# Patient Record
Sex: FEMALE | Race: WHITE
[De-identification: names, ages, dates, MRNs, and addresses within clinical notes are randomized per-mention and may not be internally consistent; named-entity substitution may affect disease eponyms.]

---

## 2019-11-19 ENCOUNTER — HOSPITAL ENCOUNTER (OUTPATIENT)
Dept: HOSPITAL 59 - SUR | Age: 74
LOS: 1 days | Discharge: HOME | End: 2019-11-20
Attending: ORTHOPAEDIC SURGERY
Payer: MEDICARE

## 2019-11-19 DIAGNOSIS — I10: ICD-10-CM

## 2019-11-19 DIAGNOSIS — M17.11: Primary | ICD-10-CM

## 2019-11-19 DIAGNOSIS — K21.9: ICD-10-CM

## 2019-11-19 LAB
ABO GROUP: (no result)
ANTIBODY SCREEN: NEGATIVE
RH TYPE: NEGATIVE

## 2019-11-19 PROCEDURE — 86901 BLOOD TYPING SEROLOGIC RH(D): CPT

## 2019-11-19 PROCEDURE — 85014 HEMATOCRIT: CPT

## 2019-11-19 PROCEDURE — 86850 RBC ANTIBODY SCREEN: CPT

## 2019-11-19 PROCEDURE — 80048 BASIC METABOLIC PNL TOTAL CA: CPT

## 2019-11-19 PROCEDURE — 86900 BLOOD TYPING SEROLOGIC ABO: CPT

## 2019-11-19 PROCEDURE — 85018 HEMOGLOBIN: CPT

## 2019-11-19 RX ADMIN — VANCOMYCIN SCH MLS/HR: 1 INJECTION, SOLUTION INTRAVENOUS at 22:56

## 2019-11-19 RX ADMIN — HYDROCODONE BITARTRATE AND ACETAMINOPHEN PRN EACH: 325; 10 TABLET ORAL at 20:15

## 2019-11-19 RX ADMIN — DOCUSATE SODIUM SCH MG: 100 TABLET, FILM COATED ORAL at 21:19

## 2019-11-19 RX ADMIN — HYDROCODONE BITARTRATE AND ACETAMINOPHEN PRN EACH: 325; 10 TABLET ORAL at 16:49

## 2019-11-19 RX ADMIN — DEXTROSE AND SODIUM CHLORIDE SCH MLS/HR: 5; .9 INJECTION, SOLUTION INTRAVENOUS at 15:56

## 2019-11-20 LAB
ANION GAP SERPL CALC-SCNC: 10 MMOL/L (ref 7–16)
BUN SERPL-MCNC: 16 MG/DL (ref 8–23)
CO2 SERPL-SCNC: 24 MMOL/L (ref 22–29)
CREAT SERPL-MCNC: 0.8 MG/DL (ref 0.5–0.9)
EST GLOMERULAR FILTRATION RATE: > 60 ML/MIN
GLUCOSE SERPL-MCNC: 105 MG/DL (ref 74–109)
HCT VFR BLD CALC: 35.5 % (ref 35–47)
HGB BLD-MCNC: 10.8 GM/DL (ref 11.6–16)

## 2019-11-20 RX ADMIN — DEXTROSE AND SODIUM CHLORIDE SCH: 5; .9 INJECTION, SOLUTION INTRAVENOUS at 03:58

## 2019-11-20 RX ADMIN — HYDROCODONE BITARTRATE AND ACETAMINOPHEN PRN EACH: 325; 10 TABLET ORAL at 10:14

## 2019-11-20 RX ADMIN — HYDROCODONE BITARTRATE AND ACETAMINOPHEN PRN EACH: 325; 10 TABLET ORAL at 14:01

## 2019-11-20 RX ADMIN — DOCUSATE SODIUM SCH MG: 100 TABLET, FILM COATED ORAL at 10:14

## 2019-11-20 RX ADMIN — VANCOMYCIN SCH: 1 INJECTION, SOLUTION INTRAVENOUS at 12:03

## 2019-11-20 RX ADMIN — DEXTROSE AND SODIUM CHLORIDE SCH: 5; .9 INJECTION, SOLUTION INTRAVENOUS at 12:03

## 2019-11-20 RX ADMIN — HYDROCODONE BITARTRATE AND ACETAMINOPHEN PRN EACH: 325; 10 TABLET ORAL at 03:58

## 2019-11-20 RX ADMIN — VANCOMYCIN SCH MLS/HR: 1 INJECTION, SOLUTION INTRAVENOUS at 10:15

## 2019-11-20 NOTE — OPERATIVE NOTE
DATE OF SURGERY: 11/19/2019 



PREOPERATIVE DIAGNOSIS: End-stage arthrosis of the right knee. 



POSTOPERATIVE DIAGNOSIS: End-stage arthrosis of the right knee. 



OPERATION: Cemented right total knee arthroplasty using [employer]hew 
components with a size 5 cobalt chrome Legion femoral component, a size 4 
stemmed tibia baseplate, an 11 mm lipped tibial insert, and a 35 mm all-plastic 
patella. 



STAFF SURGEON: Raza Padilla MD 



ANESTHESIA: Spinal. 



PREPARATION: Chloraprep. 



INDIVIDUAL CONSIDERATIONS: None. 



PROCEDURE: The patient was taken to the operating room, placed supine on the 
operating room table. She had a successful induction of a spinal anesthetic. The
right lower extremity was prepped and draped in the usual fashion. 



The limb was elevated and tourniquet was inflated to 250 mmHg. The patient had a
midline approach to the knee. Sharp dissection carried down through skin and 
subcutaneous tissue. Small veins were coagulated with a Bovie. A medial 
arthrotomy was performed. The patella was everted and the knee was flexed. The 
patient had bone loss and exposed bone in the medial compartment and obviously 
cartilage loss. There was also exposed bone in the notch. Fat pad was resected, 
ACL was sacrificed, and provisional anterior meniscectomies were performed. The 
capsule was released from the medial proximal tibia. The initial femoral  
hole was then made freehand. The intramedullary femoral cutting jig was placed. 
It was cut in 7.0 degrees of valgus and adjusted for rotation and secured with 
pins for a 10 mm resection. The initial transverse cut was then made. The skin 
guide was placed in the anterior and posterior  holes. It was found that a 
size 5 would be appropriate but I had to translate it anteriorly 2 mm. The 
anterior and posterior cuts followed by chamfer cuts were made. Osteophytes 
removed, and a size 5 trial was placed and found to fit well. 



The tibia was brought forward, and the remainder of the meniscal remnants 
removed with a Bovie. The extraarticular tibial cutting jig was placed. It was 
cut in neutral with a 3-degree AP slope. Care was taken to adjust for rotation 
using the extraarticular alignment guide and bony landmarks. It was set for a 9 
mm resection keyed off the high lateral side and secured with pins. When cutting
the tibia, care was taken to preserve the PCL insertion on the tibia. It was 
found that a size 4 fit appropriately and it was adjusted for rotation and 
secured with pins. With an 11 mm trial and femoral trial, there was excellent 
motion and stability. Ligamentous balance and rotation alignment were thought to
be normal. Femoral  holes were impacted and the tibial keel stamp was 
impacted, and these trial components were removed. 



The patient had a thick patella and roughly 9 mm of bone was removed freehand. I
was easily able to fit a 35 patella. The 3  holes were drilled. The 
tourniquet was let down briefly to get bleeders posteriorly and then placed back
up again. PCL was found to be completely competent. The knee was then thoroughly
irrigated out with pulsatile Betadine and saline to remove any visual or 
palpable debris. Bony surfaces were then dried. A size 4 stemmed tibia baseplate
was cemented into place followed by impaction of the 11 mm lipped tibial insert 
followed by cementing in the size 5 cobalt chrome Legion femur followed by 
cementing in the 35 mm patella. The implant surfaces were compressed, excess 
cement was removed. After the cement had set, there was excellent motion and 
stability. Ligamentous balance, rotation alignment, and patellofemoral tracking 
were normal. No lateral release was required. After irrigation, tourniquet was 
let down. Hemostasis was obtained with a Bovie. The capsule was then closed with
a running #2 quill. Prior to this, I infiltrate the skin, subcu, and periosteum 
with 30 mL of 0.5% Marcaine with epinephrine. After closing the capsule, the 
subcu was closed in layers with running 0 quill, skin was closed with staples. 
Then 1 g of tranexamic acid was mixed with 30 mL of saline and injected into the
knee through a sterile 18-gauge needle, and a sterile bulky compressive YAHIR-
type dressing was applied. The patient tolerated the procedure well. Needle and 
sponge counts were correct. Estimated blood loss was minimal, and she was taken 
back to recovery in good condition. There were no complications. 

TYRON

## 2019-11-20 NOTE — REHAB EVALUATION
Patient Information





- Patient Information


Diagnosis: R Knee DJD


Ordered Treatment: PT Evaluate and Treat


Status: Initial Evaluation


Surgery: Yes (R TKA)


Date of Surgery: 11/19/19


Past Medical/Surgical Hx: 


                          PAST MEDICAL/SURGICAL HISTORY





Past Surgical History            BACK INJS


                                 RIGHT KNEE SCOPE


                                 TONSILS


                                 BUNIONECTOMY


                                 BALAT CATARACTS


                                 C SCOPES





PMH - Respiratory





Hx Respiratory Disorders         Yes


Hx Bronchitis                    Yes: IN THE PAST





PMH - Cardiovascular





Hx Cardiovascular Disorders      Yes


Hx Hypertension                  Yes: CONTROLLED WITH MEDS


Exercise Tolerance               Fair





PMH - Neuro





Hx Neurological Disorders        No





PMH - GI





Hx Gastrointestinal Disorders    Yes


Hx Gastroesophageal Reflux       Yes: OCCASSIONALLY USES ZANTAC





PMH - 





Hx Genitourinary Disorders       Yes


Hx Age of Menopause              48


Hx Bladder Problem               Yes: LEAKY BLADDER





PMH - Endocrine





Hx Endocrine Disorders           No





PMH - Musculoskeletal





Hx Musculoskeletal Disorders     Yes


Hx Arthritis                     Yes: RIGHT KNEE, FEET AND HANDS


Comment:                         LEFT DROP FOOT





PMH - Psych





Hx Psychiatric Problems          No





PMH - Hematology/Oncology





Hx Hematology/Oncology           Yes


Disorders                        


Hx Bruising                      Yes: BRUISES EASILY








Premorbid Status: Detail (Patient reported that she used her staight cane at 

times before surgery when ambulating long distances.)


Social History: Detail (Patient lives in a 1-story home with her spouse. There 

are 3 steps to enter with a railing on the R when entering. In the bathroom 

there is a tub/shower combination with a hand held shower head and a shower 

bench but no grab bars. There is an elevated toilet seat and she stated that 

there is a sink to hang onto to get up. She has a front-wheeled walker and a 

straight cane available to use.)


Precautions: Universal, Fall, Other (WBAT on R LE)





- Time With Patient


Total Time Spent With Patient (Min): 30


Treatment Procedures: Detail (Initial evaluation; low complexity The patient was

left in bed with her call light and bedside table within reach. The nursing 

staff was notified of her position.)





Subjective Information





- Subjective Information


Per Patient (Patient reported that she was feeling well. She said that she was 

having 5-6/10 pain in her R knee.)





Objective Data





- Pain


Pain Present: Yes (R knee)


Pain Scale Used: Numeric (1 - 10) (5-6/10)





- Mental Status


Patient Orientation: Oriented x3





- Visual Perception


Appears within normal limits for therapeutic activities





- ROM


Not within normal limits (R knee ROM is limited as expected s/p R TKA procedure.

R ankle and hip, and L LE AROM is within normal limits for functional 

activities.)





- Strength/Tone


Not within normal limits (R knee strength is limited as expected s/p R TKA 

procedure. R ankle and hip, and L LE strenght is within normal limits for 

functional activities.)





- Coordination


Appears within normal limits for therapeutic activities





- Bed Mobility


Independent (Patient was independent in bed mobility tasks.)





- Transfers


Independent (Patient was independent in sit to stand, stand to sit, supine to 

sit, and sit to supine.)





- Balance


Balance Sitting: Good


Balance Standing: Good





- Gait


Detail (Patient ambulated 125 feet using the front-wheeled walker independently 

using WBAT on the R LE. She completed stair training over 3 stairs with 

supervision using the correct technique and the walker as an extra hand 

railing.)





Therapy Assessment





- Therapy Assessment


Detail (Patient presents with decreased R knee ROM, decreased R knee strength, 

and gait abnormalities. She has met all of her inpatient goals at this time.)





Patient Education





- Patient Education


Teaching Topic: Exercise/Activity (Patient's knee HEP was reviewed with her. The

patient demonstrated good understanding and technique of all exercises.)





Problem List





- Problem List


Physical Therapy Problem List: Detail (1. R knee pain 2. Decreased R knee ROM 3.

Gait abnormalities 4. Decreased R knee strength)





Goals





- Goals


Physical Therapy Goals: All inpatient therapy goals have been met at this time.





Prognosis





- Prognosis


Good





Plan





- Plan


Physical Therapy Plan: Patient has met all of her inpatient therapy goals. She 

is schedule to begin home therapy following discharge.

## 2019-11-20 NOTE — REHAB EVALUATION
Patient Information





- Patient Information


Diagnosis: R Knee DJD


Ordered Treatment: OT Evaluate and Treat


Status: Initial Evaluation


Surgery: Yes (R TKA)


Date of Surgery: 11/19/19


Past Medical/Surgical Hx: 


                          PAST MEDICAL/SURGICAL HISTORY





Past Surgical History            BACK INJS


                                 RIGHT KNEE SCOPE


                                 TONSILS


                                 BUNIONECTOMY


                                 BALAT CATARACTS


                                 C SCOPES





PMH - Respiratory





Hx Respiratory Disorders         Yes


Hx Bronchitis                    Yes: IN THE PAST





PMH - Cardiovascular





Hx Cardiovascular Disorders      Yes


Hx Hypertension                  Yes: CONTROLLED WITH MEDS


Exercise Tolerance               Fair





PMH - Neuro





Hx Neurological Disorders        No





PMH - GI





Hx Gastrointestinal Disorders    Yes


Hx Gastroesophageal Reflux       Yes: OCCASSIONALLY USES ZANTAC





PMH - 





Hx Genitourinary Disorders       Yes


Hx Age of Menopause              48


Hx Bladder Problem               Yes: LEAKY BLADDER





PMH - Endocrine





Hx Endocrine Disorders           No





PMH - Musculoskeletal





Hx Musculoskeletal Disorders     Yes


Hx Arthritis                     Yes: RIGHT KNEE, FEET AND HANDS


Comment:                         LEFT DROP FOOT





PMH - Psych





Hx Psychiatric Problems          No





PMH - Hematology/Oncology





Hx Hematology/Oncology           Yes


Disorders                        


Hx Bruising                      Yes: BRUISES EASILY








Premorbid Status: Detail (Patient reported that she used her staight cane at 

times before surgery when ambulating long distances, independent with all ADLs.)


Social History: Detail (Patient lives in a multi-level home with her spouse. 

There are 3 steps to enter with a railing on the R when entering. There are 

bilateral handrails on the steps to the basement laundry, however Pt reports her

daughters can help if needed with laundry at OR. In the bathroom there is a 

tub/shower combination with a hand held shower head and a shower chair but no 

grab bars. She has an elevated toilet seat and she stated that there is a sink 

to hang onto to get up. She has a front-wheeled walker and a straight cane 

available to use.)


Precautions: Universal, Fall, Other (WBAT on R LE)





- Time With Patient


Total Time Spent With Patient (Min): 40 (1 eval low, 1 ADL)


Treatment Procedures: Detail (OT eval: low complexity)





Subjective Information





- Subjective Information


Per Patient (Pt supine in bed upon arrival, wanting to leave at 1pm.  Session 

ended w/ Pt up in chair with ice on R knee and call light in reach.)





Objective Data





- Pain


Pain Present: Yes (5/10 R knee, recently had pain meds)





- Mental Status


Patient Orientation: Oriented x3





- Visual Perception


Appears within normal limits for therapeutic activities





- ROM


Within normal limits (B UEs)





- Strength/Tone


Within normal limits (B UEs)





- Coordination


Appears within normal limits for therapeutic activities





- Bed Mobility


Independent





- Transfers


Independent (supine to EOB)





- Balance


Balance Sitting: Good, Fair


Balance Standing: Fair (w/ walker)





- Sensation


Intact





- Gait


Detail (Fxl mobility within bedroom with FWW and supervision - verbal cueing for

safe walker use, Pt demos increasing follow thru throughout eval.)





- ADL's/IADL's


Detail (OT educates/demos adaptive technique for LB dressing and Pt demos follow

thru to don/doff underwear, pants, socks, and slippers with increased effort.  

Pt acknowledges tedhose wearing schedule, therapist educates on adaptive techs 

for increased success. OT educates Pt on importance of using shower chair 

initially upon DC, Pt verbalizes understanding.)





- Special Tests


No





Therapy Assessment





- Therapy Assessment


Detail (Pt demos safety and independence with all ADLs and fxl mobility with 

walker. Pt will have spouse available to assist upon DC.  No further IP OT needs

identified.)





Patient Education





- Patient Education


Teaching Topic: Other (adaptive techniques)


Response: Return Demonstration, Verbalize Understanding


Teaching Method: Discussion, Demonstration


Teaching Recipient: Patient


Barriers To Learning: None





Problem List





- Problem List


Physical Therapy Problem List: Detail (1. R knee pain 2. Decreased R knee ROM 3.

Gait abnormalities 4. Decreased R knee strength)


Occupational Therapy Problem List: Detail (No further IP OT needs identified.)





Goals





- Goals


Physical Therapy Goals: All inpatient therapy goals have been met at this time.


Occupational Therapy Goals: No further IP OT needs/goals identified.





Prognosis





- Prognosis


Good





Plan





- Plan


Physical Therapy Plan: Patient has met all of her inpatient therapy goals. She 

is schedule to begin home therapy following discharge.


Occupational Therapy Plan: No further skilled IP OT needs identified. DC OT 

services.  Thank you for this referral.